# Patient Record
Sex: MALE | Race: BLACK OR AFRICAN AMERICAN
[De-identification: names, ages, dates, MRNs, and addresses within clinical notes are randomized per-mention and may not be internally consistent; named-entity substitution may affect disease eponyms.]

---

## 2018-02-19 ENCOUNTER — HOSPITAL ENCOUNTER (EMERGENCY)
Dept: HOSPITAL 35 - ER | Age: 33
Discharge: HOME | End: 2018-02-19
Payer: COMMERCIAL

## 2018-02-19 VITALS — HEIGHT: 72 IN | BODY MASS INDEX: 27.77 KG/M2 | WEIGHT: 205.01 LBS

## 2018-02-19 VITALS — DIASTOLIC BLOOD PRESSURE: 56 MMHG | SYSTOLIC BLOOD PRESSURE: 127 MMHG

## 2018-02-19 DIAGNOSIS — J45.909: ICD-10-CM

## 2018-02-19 DIAGNOSIS — R10.13: Primary | ICD-10-CM

## 2018-02-19 DIAGNOSIS — Z87.442: ICD-10-CM

## 2018-02-19 DIAGNOSIS — K59.00: ICD-10-CM

## 2018-02-19 LAB
ALBUMIN SERPL-MCNC: 4.3 G/DL (ref 3.4–5)
ALT SERPL-CCNC: 41 U/L (ref 30–65)
ANION GAP SERPL CALC-SCNC: 12 MMOL/L (ref 7–16)
AST SERPL-CCNC: 24 U/L (ref 15–37)
BASOPHILS NFR BLD AUTO: 0.1 % (ref 0–2)
BILIRUB DIRECT SERPL-MCNC: 0.3 MG/DL
BILIRUB SERPL-MCNC: 1.3 MG/DL
BUN SERPL-MCNC: 7 MG/DL (ref 7–18)
CALCIUM SERPL-MCNC: 9 MG/DL (ref 8.5–10.1)
CHLORIDE SERPL-SCNC: 101 MMOL/L (ref 98–107)
CO2 SERPL-SCNC: 25 MMOL/L (ref 21–32)
CREAT SERPL-MCNC: 1.2 MG/DL (ref 0.7–1.3)
EOSINOPHIL NFR BLD: 0.4 % (ref 0–3)
ERYTHROCYTE [DISTWIDTH] IN BLOOD BY AUTOMATED COUNT: 14 % (ref 10.5–14.5)
GLUCOSE SERPL-MCNC: 124 MG/DL (ref 74–106)
GRANULOCYTES NFR BLD MANUAL: 87.1 % (ref 36–66)
HCT VFR BLD CALC: 46.7 % (ref 42–52)
HGB BLD-MCNC: 15.9 GM/DL (ref 14–18)
LIPASE: 105 U/L (ref 73–393)
LYMPHOCYTES NFR BLD AUTO: 11.6 % (ref 24–44)
MCH RBC QN AUTO: 29.9 PG (ref 26–34)
MCHC RBC AUTO-ENTMCNC: 34.1 G/DL (ref 28–37)
MCV RBC: 87.7 FL (ref 80–100)
MONOCYTES NFR BLD: 0.8 % (ref 1–8)
NEUTROPHILS # BLD: 7.7 THOU/UL (ref 1.4–8.2)
PLATELET # BLD: 282 THOU/UL (ref 150–400)
POTASSIUM SERPL-SCNC: 3.3 MMOL/L (ref 3.5–5.1)
PROT SERPL-MCNC: 7.2 G/DL (ref 6.4–8.2)
RBC # BLD AUTO: 5.33 MIL/UL (ref 4.5–6)
SODIUM SERPL-SCNC: 138 MMOL/L (ref 136–145)
WBC # BLD AUTO: 8.9 THOU/UL (ref 4–11)

## 2018-05-27 ENCOUNTER — HOSPITAL ENCOUNTER (EMERGENCY)
Dept: HOSPITAL 35 - ER | Age: 33
Discharge: HOME | End: 2018-05-27
Payer: COMMERCIAL

## 2018-05-27 VITALS — HEIGHT: 71 IN | WEIGHT: 205.01 LBS | BODY MASS INDEX: 28.7 KG/M2

## 2018-05-27 DIAGNOSIS — M54.5: Primary | ICD-10-CM

## 2018-05-27 DIAGNOSIS — J45.909: ICD-10-CM

## 2018-09-29 ENCOUNTER — HOSPITAL ENCOUNTER (EMERGENCY)
Dept: HOSPITAL 35 - ER | Age: 33
LOS: 1 days | Discharge: HOME | End: 2018-09-30
Payer: COMMERCIAL

## 2018-09-29 VITALS — WEIGHT: 200 LBS | HEIGHT: 72 IN | BODY MASS INDEX: 27.09 KG/M2

## 2018-09-29 DIAGNOSIS — J45.909: ICD-10-CM

## 2018-09-29 DIAGNOSIS — Z87.442: ICD-10-CM

## 2018-09-29 DIAGNOSIS — R05: Primary | ICD-10-CM

## 2018-12-25 ENCOUNTER — HOSPITAL ENCOUNTER (EMERGENCY)
Dept: HOSPITAL 35 - ER | Age: 33
Discharge: HOME | End: 2018-12-25
Payer: COMMERCIAL

## 2018-12-25 VITALS — SYSTOLIC BLOOD PRESSURE: 136 MMHG | DIASTOLIC BLOOD PRESSURE: 87 MMHG

## 2018-12-25 VITALS — WEIGHT: 205.01 LBS | BODY MASS INDEX: 27.77 KG/M2 | HEIGHT: 72 IN

## 2018-12-25 DIAGNOSIS — R10.13: Primary | ICD-10-CM

## 2018-12-25 DIAGNOSIS — R19.7: ICD-10-CM

## 2018-12-25 DIAGNOSIS — Z87.442: ICD-10-CM

## 2018-12-25 DIAGNOSIS — J45.909: ICD-10-CM

## 2018-12-25 LAB
ALBUMIN SERPL-MCNC: 4.3 G/DL (ref 3.4–5)
ALT SERPL-CCNC: 35 U/L (ref 30–65)
ANION GAP SERPL CALC-SCNC: 13 MMOL/L (ref 7–16)
AST SERPL-CCNC: 23 U/L (ref 15–37)
BASOPHILS NFR BLD AUTO: 0.4 % (ref 0–2)
BILIRUB SERPL-MCNC: 0.6 MG/DL
BILIRUB UR-MCNC: NEGATIVE MG/DL
BUN SERPL-MCNC: 11 MG/DL (ref 7–18)
CALCIUM SERPL-MCNC: 8.9 MG/DL (ref 8.5–10.1)
CHLORIDE SERPL-SCNC: 100 MMOL/L (ref 98–107)
CO2 SERPL-SCNC: 26 MMOL/L (ref 21–32)
COLOR UR: YELLOW
CREAT SERPL-MCNC: 1 MG/DL (ref 0.7–1.3)
EOSINOPHIL NFR BLD: 0.3 % (ref 0–3)
ERYTHROCYTE [DISTWIDTH] IN BLOOD BY AUTOMATED COUNT: 14.3 % (ref 10.5–14.5)
GLUCOSE SERPL-MCNC: 94 MG/DL (ref 74–106)
GRANULOCYTES NFR BLD MANUAL: 73.1 % (ref 36–66)
HCT VFR BLD CALC: 49.4 % (ref 42–52)
HGB BLD-MCNC: 16.7 GM/DL (ref 14–18)
KETONES UR STRIP-MCNC: NEGATIVE MG/DL
LIPASE: 96 U/L (ref 73–393)
LYMPHOCYTES NFR BLD AUTO: 19.6 % (ref 24–44)
MCH RBC QN AUTO: 30.4 PG (ref 26–34)
MCHC RBC AUTO-ENTMCNC: 33.8 G/DL (ref 28–37)
MCV RBC: 89.7 FL (ref 80–100)
MONOCYTES NFR BLD: 6.6 % (ref 1–8)
NEUTROPHILS # BLD: 6.2 THOU/UL (ref 1.4–8.2)
PLATELET # BLD: 360 THOU/UL (ref 150–400)
POTASSIUM SERPL-SCNC: 3.7 MMOL/L (ref 3.5–5.1)
PROT SERPL-MCNC: 7.6 G/DL (ref 6.4–8.2)
RBC # BLD AUTO: 5.5 MIL/UL (ref 4.5–6)
RBC # UR STRIP: NEGATIVE /UL
SODIUM SERPL-SCNC: 139 MMOL/L (ref 136–145)
SP GR UR STRIP: <= 1.005 (ref 1–1.03)
URINE CLARITY: CLEAR
URINE GLUCOSE-RANDOM*: NEGATIVE
URINE LEUKOCYTES-REFLEX: NEGATIVE
URINE NITRITE-REFLEX: NEGATIVE
URINE PROTEIN (DIPSTICK): NEGATIVE
UROBILINOGEN UR STRIP-ACNC: 0.2 E.U./DL (ref 0.2–1)
WBC # BLD AUTO: 8.5 THOU/UL (ref 4–11)

## 2019-01-18 ENCOUNTER — HOSPITAL ENCOUNTER (EMERGENCY)
Dept: HOSPITAL 35 - ER | Age: 34
Discharge: HOME | End: 2019-01-18
Payer: COMMERCIAL

## 2019-01-18 VITALS — BODY MASS INDEX: 28.17 KG/M2 | HEIGHT: 72 IN | WEIGHT: 208.01 LBS

## 2019-01-18 VITALS — DIASTOLIC BLOOD PRESSURE: 81 MMHG | SYSTOLIC BLOOD PRESSURE: 117 MMHG

## 2019-01-18 DIAGNOSIS — R10.13: Primary | ICD-10-CM

## 2019-01-18 DIAGNOSIS — Z87.442: ICD-10-CM

## 2019-01-18 DIAGNOSIS — J45.909: ICD-10-CM

## 2019-01-18 LAB
ANION GAP SERPL CALC-SCNC: 11 MMOL/L (ref 7–16)
BASOPHILS NFR BLD AUTO: 0.4 % (ref 0–2)
BUN SERPL-MCNC: 13 MG/DL (ref 7–18)
CALCIUM SERPL-MCNC: 8.9 MG/DL (ref 8.5–10.1)
CHLORIDE SERPL-SCNC: 106 MMOL/L (ref 98–107)
CO2 SERPL-SCNC: 25 MMOL/L (ref 21–32)
CREAT SERPL-MCNC: 0.9 MG/DL (ref 0.7–1.3)
EOSINOPHIL NFR BLD: 1.1 % (ref 0–3)
ERYTHROCYTE [DISTWIDTH] IN BLOOD BY AUTOMATED COUNT: 13.6 % (ref 10.5–14.5)
GLUCOSE SERPL-MCNC: 109 MG/DL (ref 74–106)
GRANULOCYTES NFR BLD MANUAL: 62.9 % (ref 36–66)
HCT VFR BLD CALC: 49.8 % (ref 42–52)
HGB BLD-MCNC: 16.8 GM/DL (ref 14–18)
LYMPHOCYTES NFR BLD AUTO: 30.1 % (ref 24–44)
MCH RBC QN AUTO: 30.1 PG (ref 26–34)
MCHC RBC AUTO-ENTMCNC: 33.8 G/DL (ref 28–37)
MCV RBC: 89 FL (ref 80–100)
MONOCYTES NFR BLD: 5.5 % (ref 1–8)
NEUTROPHILS # BLD: 5.1 THOU/UL (ref 1.4–8.2)
PLATELET # BLD: 236 THOU/UL (ref 150–400)
POTASSIUM SERPL-SCNC: 3.5 MMOL/L (ref 3.5–5.1)
RBC # BLD AUTO: 5.59 MIL/UL (ref 4.5–6)
SODIUM SERPL-SCNC: 142 MMOL/L (ref 136–145)
WBC # BLD AUTO: 8.2 THOU/UL (ref 4–11)

## 2019-11-01 ENCOUNTER — HOSPITAL ENCOUNTER (EMERGENCY)
Dept: HOSPITAL 35 - ER | Age: 34
Discharge: HOME | End: 2019-11-01
Payer: COMMERCIAL

## 2019-11-01 VITALS — WEIGHT: 205.01 LBS | BODY MASS INDEX: 27.77 KG/M2 | HEIGHT: 72 IN

## 2019-11-01 VITALS — DIASTOLIC BLOOD PRESSURE: 82 MMHG | SYSTOLIC BLOOD PRESSURE: 134 MMHG

## 2019-11-01 DIAGNOSIS — S09.8XXA: ICD-10-CM
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
## 2019-11-01 DIAGNOSIS — Z87.442: ICD-10-CM
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
## 2019-11-01 DIAGNOSIS — S16.1XXA: Primary | ICD-10-CM
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
## 2019-11-01 DIAGNOSIS — J45.909: ICD-10-CM

## 2019-11-13 ENCOUNTER — HOSPITAL ENCOUNTER (OUTPATIENT)
Dept: HOSPITAL 35 - CAT | Age: 34
End: 2019-11-13
Attending: FAMILY MEDICINE
Payer: COMMERCIAL

## 2019-11-13 DIAGNOSIS — R51: Primary | ICD-10-CM
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
## 2020-02-23 ENCOUNTER — HOSPITAL ENCOUNTER (EMERGENCY)
Dept: HOSPITAL 35 - ER | Age: 35
Discharge: HOME | End: 2020-02-23
Payer: COMMERCIAL

## 2020-02-23 VITALS — DIASTOLIC BLOOD PRESSURE: 71 MMHG | SYSTOLIC BLOOD PRESSURE: 122 MMHG

## 2020-02-23 VITALS — HEIGHT: 72 IN | BODY MASS INDEX: 28.45 KG/M2 | WEIGHT: 210.01 LBS

## 2020-02-23 DIAGNOSIS — K52.89: Primary | ICD-10-CM

## 2020-02-23 DIAGNOSIS — J45.909: ICD-10-CM

## 2020-02-23 LAB
ALBUMIN SERPL-MCNC: 3.9 G/DL (ref 3.4–5)
ALT SERPL-CCNC: 50 U/L (ref 30–65)
ANION GAP SERPL CALC-SCNC: 7 MMOL/L (ref 7–16)
AST SERPL-CCNC: 27 U/L (ref 15–37)
BASOPHILS NFR BLD AUTO: 0.8 % (ref 0–2)
BILIRUB SERPL-MCNC: 0.6 MG/DL
BILIRUB UR-MCNC: NEGATIVE MG/DL
BUN SERPL-MCNC: 14 MG/DL (ref 7–18)
CALCIUM SERPL-MCNC: 8.5 MG/DL (ref 8.5–10.1)
CHLORIDE SERPL-SCNC: 102 MMOL/L (ref 98–107)
CO2 SERPL-SCNC: 30 MMOL/L (ref 21–32)
COLOR UR: YELLOW
CREAT SERPL-MCNC: 1.2 MG/DL (ref 0.7–1.3)
EOSINOPHIL NFR BLD: 1.1 % (ref 0–3)
ERYTHROCYTE [DISTWIDTH] IN BLOOD BY AUTOMATED COUNT: 14.5 % (ref 10.5–14.5)
GLUCOSE SERPL-MCNC: 106 MG/DL (ref 74–106)
GRANULOCYTES NFR BLD MANUAL: 89.3 % (ref 36–66)
HCT VFR BLD CALC: 52.2 % (ref 42–52)
HGB BLD-MCNC: 17.4 GM/DL (ref 14–18)
KETONES UR STRIP-MCNC: (no result) MG/DL
LIPASE: 86 U/L (ref 73–393)
LYMPHOCYTES NFR BLD AUTO: 3.2 % (ref 24–44)
MCH RBC QN AUTO: 29.8 PG (ref 26–34)
MCHC RBC AUTO-ENTMCNC: 33.2 G/DL (ref 28–37)
MCV RBC: 89.7 FL (ref 80–100)
MONOCYTES NFR BLD: 5.6 % (ref 1–8)
MUCUS: (no result) STRN/LPF
NEUTROPHILS # BLD: 9.7 THOU/UL (ref 1.4–8.2)
PLATELET # BLD: 260 THOU/UL (ref 150–400)
POTASSIUM SERPL-SCNC: 4.3 MMOL/L (ref 3.5–5.1)
PROT SERPL-MCNC: 7.3 G/DL (ref 6.4–8.2)
RBC # BLD AUTO: 5.82 MIL/UL (ref 4.5–6)
RBC # UR STRIP: NEGATIVE /UL
SODIUM SERPL-SCNC: 139 MMOL/L (ref 136–145)
SP GR UR STRIP: >= 1.03 (ref 1–1.03)
URINE CLARITY: CLEAR
URINE GLUCOSE-RANDOM*: NEGATIVE
URINE LEUKOCYTES-REFLEX: NEGATIVE
URINE NITRITE-REFLEX: POSITIVE
URINE PROTEIN (DIPSTICK): NEGATIVE
URINE WBC-REFLEX: (no result) /HPF (ref 0–5)
UROBILINOGEN UR STRIP-ACNC: 0.2 E.U./DL (ref 0.2–1)
WBC # BLD AUTO: 10.9 THOU/UL (ref 4–11)

## 2020-02-24 NOTE — EKG
[facility]
Ariana Gasca
[place]   [address]                     ELECTROCARDIOGRAM REPORT      
_______________________________________________________________________________
 
Name:       TOÑO LALA              Room #:                     DEP [facility]#:      [id number]                       Account #:      [id number]  
Admission:  20    Attend Phys:                          
Discharge:  20    Date of Birth:  [birth year]  
                                                          Report #: [id number]
                                                                    [id number]
_______________________________________________________________________________
THIS REPORT FOR:  
 
cc:  Tereso Carbajal James A. DO Lundgren, Craig H. MD [facility]
THIS REPORT FOR:   //name//                          
 
                         [facility] ED
                                       
Test Date:    2020               Test Time:    18:28:09
Pat Name:     TOÑO LALA           Department:   
Patient ID:   [id number]            Room:          
Gender:                               Technician:   ESHEETS
:          1985               Requested By: Jones Barfield
Order Number: [id number] MD:   Edwin Merino
                                 Measurements
Intervals                              Axis          
Rate:         84                       P:            -4
IA:           162                      QRS:          -3
QRSD:         74                       T:            22
QT:           328                                    
QTc:          388                                    
                           Interpretive Statements
Sinus rhythm
Normal tracing
Compared to ECG 2018 07:48:57
No significant changes
 
Electronically Signed On 2020 8:40:44 CST by Edwin Merino
[email or web address]
 
 
 
 
 
 
 
 
 
 
 
 
 
 
  <ELECTRONICALLY SIGNED>
   By: Edwin Merino MD, Located within [facility]   
  20     0840
D: 20 1828                           _____________________________________
T: 20 1828                           Edwin Merino MD, Located within [facility]     /EPI